# Patient Record
Sex: MALE | Race: WHITE | NOT HISPANIC OR LATINO | URBAN - METROPOLITAN AREA
[De-identification: names, ages, dates, MRNs, and addresses within clinical notes are randomized per-mention and may not be internally consistent; named-entity substitution may affect disease eponyms.]

---

## 2024-03-06 ENCOUNTER — APPOINTMENT (RX ONLY)
Dept: URBAN - METROPOLITAN AREA CLINIC 75 | Facility: CLINIC | Age: 69
Setting detail: DERMATOLOGY
End: 2024-03-06

## 2024-03-06 DIAGNOSIS — L71.8 OTHER ROSACEA: ICD-10-CM

## 2024-03-06 PROCEDURE — ? VBEAM PRIMA LASER

## 2024-03-06 PROCEDURE — ? INVENTORY

## 2024-03-06 PROCEDURE — ? COUNSELING

## 2024-03-06 ASSESSMENT — LOCATION DETAILED DESCRIPTION DERM: LOCATION DETAILED: NASAL TIP

## 2024-03-06 ASSESSMENT — LOCATION SIMPLE DESCRIPTION DERM: LOCATION SIMPLE: NOSE

## 2024-03-06 ASSESSMENT — LOCATION ZONE DERM: LOCATION ZONE: NOSE

## 2024-03-06 NOTE — PROCEDURE: VBEAM PRIMA LASER
Detail Level: Zone
Spot Size: 7 mm
Post-Care Instructions: I reviewed with the patient in detail post-care instructions. \\nPatient should wear sun protection until treated areas are fully healed
Spot Size: 14 mm
Wavelength: 595 nm
Delay Time (Ms): 20
Cryogen Time (Ms): 30
Comments: #, nm, J, ms, mm, [id number],1064,27.00,10.00,11.0,10\\[id number],1064,15.00,10.00,11.0,3\\[id number], 595,7.00,1.50,11.0,27\\k3338-12-14, 595,5.75,10.00,11.0,38
Spot Size: 15 mm
Post-Procedure Care: Ice applied. \\nSunscreen recommended
Consent: Written consent obtained, risks reviewed including but not limited to crusting, scabbing, blistering, scarring, darker or lighter pigmentary change, incidental hair removal, bruising, and/or incomplete removal.

## 2024-05-20 ENCOUNTER — APPOINTMENT (RX ONLY)
Dept: URBAN - METROPOLITAN AREA CLINIC 75 | Facility: CLINIC | Age: 69
Setting detail: DERMATOLOGY
End: 2024-05-20

## 2024-05-20 DIAGNOSIS — L71.8 OTHER ROSACEA: ICD-10-CM

## 2024-05-20 PROCEDURE — ? COUNSELING

## 2024-05-20 PROCEDURE — ? VBEAM PRIMA LASER

## 2024-05-20 PROCEDURE — ? INVENTORY

## 2024-05-20 ASSESSMENT — LOCATION ZONE DERM: LOCATION ZONE: NOSE

## 2024-05-20 ASSESSMENT — LOCATION SIMPLE DESCRIPTION DERM: LOCATION SIMPLE: NOSE

## 2024-05-20 ASSESSMENT — LOCATION DETAILED DESCRIPTION DERM: LOCATION DETAILED: NASAL TIP

## 2024-05-20 NOTE — PROCEDURE: VBEAM PRIMA LASER
Detail Level: Zone
Spot Size: 7 mm
Post-Care Instructions: I reviewed with the patient in detail post-care instructions. \\nPatient should wear sun protection until treated areas are fully healed
Spot Size: 14 mm
Wavelength: 595 nm
Delay Time (Ms): 20
Cryogen Time (Ms): 30
Comments: #, nm, J, ms, mm, [id number]\\[id number], 595,7.50,1.50,12.0,5\\[id number], 595,6.00,20.00,12.0,16\\[id number], 595,6.50,1.50,12.0,15\\l1263-90-54, 595,7.50,1.50,12.0,3\\[id number], 595,9.00,1.50,12.0,5\\[id number], 595,13.00,1.50,3x10,18
Spot Size: 15 mm
Post-Procedure Care: Ice applied. \\nSunscreen recommended
Consent: Written consent obtained, risks reviewed including but not limited to crusting, scabbing, blistering, scarring, darker or lighter pigmentary change, incidental hair removal, bruising, and/or incomplete removal.